# Patient Record
Sex: FEMALE | Race: ASIAN | NOT HISPANIC OR LATINO | Employment: OTHER | URBAN - METROPOLITAN AREA
[De-identification: names, ages, dates, MRNs, and addresses within clinical notes are randomized per-mention and may not be internally consistent; named-entity substitution may affect disease eponyms.]

---

## 2019-01-06 ENCOUNTER — OFFICE VISIT (OUTPATIENT)
Dept: URGENT CARE | Facility: PHYSICIAN GROUP | Age: 69
End: 2019-01-06
Payer: MEDICARE

## 2019-01-06 VITALS
RESPIRATION RATE: 16 BRPM | WEIGHT: 120 LBS | TEMPERATURE: 98.4 F | HEART RATE: 69 BPM | DIASTOLIC BLOOD PRESSURE: 80 MMHG | SYSTOLIC BLOOD PRESSURE: 124 MMHG | HEIGHT: 63 IN | BODY MASS INDEX: 21.26 KG/M2 | OXYGEN SATURATION: 99 %

## 2019-01-06 DIAGNOSIS — S61.311A LACERATION OF LEFT INDEX FINGER WITHOUT FOREIGN BODY WITH DAMAGE TO NAIL, INITIAL ENCOUNTER: ICD-10-CM

## 2019-01-06 PROCEDURE — 12041 INTMD RPR N-HF/GENIT 2.5CM/<: CPT | Mod: F1 | Performed by: FAMILY MEDICINE

## 2019-01-06 ASSESSMENT — ENCOUNTER SYMPTOMS
BRUISES/BLEEDS EASILY: 0
FEVER: 0
DIARRHEA: 0
PALPITATIONS: 0
SHORTNESS OF BREATH: 0
VOMITING: 0
CHILLS: 0
BACK PAIN: 0
NAUSEA: 0
ABDOMINAL PAIN: 0
DIZZINESS: 0
DEPRESSION: 0
HEADACHES: 0
EYE REDNESS: 0
WHEEZING: 0
SORE THROAT: 0
EYE DISCHARGE: 0
COUGH: 0

## 2019-01-06 NOTE — PROGRESS NOTES
Subjective:      Christelle Nunez is a 68 y.o. female who presents with Laceration (L index finger with a saw blade while working at home)        67 yo female presents after cutting left index finger on a rotary saw blade while making her son floating shelves for his new home immediately prior to presenting to the urgent care. She reports that her son's friend was at the house and was able to drive her to urgent care. She has been applying pressure but it continues to bleed. Discussed with patient going to ER and she is very resistant, would like to be treated, have attempted have laceration repair at  if possible. Flexion/extension intact, no visible bone or tendons.      Review of Systems   Constitutional: Negative for chills and fever.   HENT: Negative for congestion and sore throat.    Eyes: Negative for discharge and redness.   Respiratory: Negative for cough, shortness of breath and wheezing.    Cardiovascular: Negative for chest pain and palpitations.   Gastrointestinal: Negative for abdominal pain, diarrhea, nausea and vomiting.   Genitourinary: Negative for dysuria, frequency and urgency.   Musculoskeletal: Negative for back pain and joint pain.   Skin: Negative for itching and rash.        +wound to left index finger   Neurological: Negative for dizziness and headaches.   Endo/Heme/Allergies: Negative for environmental allergies. Does not bruise/bleed easily.   Psychiatric/Behavioral: Negative for depression and suicidal ideas.   All other systems reviewed and are negative.      Past Medical History:   Diagnosis Date   • Patient denies medical problems      No past surgical history on file.    No family history on file.    Social History   Substance Use Topics   • Smoking status: Never Smoker   • Smokeless tobacco: Never Used   • Alcohol use No     Patient has no known allergies.    Current Outpatient Prescriptions on File Prior to Visit   Medication Sig Dispense Refill   • FLUoxetine (PROZAC) 20 MG  "Cap Take 20 mg by mouth every day.     • ibuprofen (MOTRIN) 200 MG Tab Take 200 mg by mouth every 6 hours as needed.       No current facility-administered medications on file prior to visit.         Objective:     /80   Pulse 69   Temp 36.9 °C (98.4 °F) (Temporal)   Resp 16   Ht 1.6 m (5' 3\")   Wt 54.4 kg (120 lb)   SpO2 99%   BMI 21.26 kg/m²      Physical Exam   Constitutional: She is oriented to person, place, and time. She appears well-developed and well-nourished. No distress.   HENT:   Head: Normocephalic and atraumatic.   Right Ear: External ear normal.   Left Ear: External ear normal.   Eyes: Conjunctivae and EOM are normal.   Cardiovascular: Normal rate and regular rhythm.    Pulmonary/Chest: Effort normal. No respiratory distress.   Musculoskeletal: Normal range of motion. She exhibits no edema or deformity.   Neurological: She is alert and oriented to person, place, and time. She exhibits normal muscle tone.   Skin: Skin is warm and dry. Capillary refill takes less than 2 seconds. No rash noted. She is not diaphoretic. No erythema.   Open wound of left index finger with active bleeding. Skin tear in multiple directions with skin flap in the center portion of the wound attached by 2 mm segment. Subcutaneous tissue mangled with swelling. No visible bone or tendon. Flexion/extension of finger intact.     LUE: neurovascularly intact   Psychiatric: She has a normal mood and affect. Her behavior is normal. Judgment and thought content normal.   Nursing note and vitals reviewed.       Assessment/Plan:     1. Laceration of left index finger without foreign body with damage to nail, initial encounter  Laceration Repair     - Wound irrigated with normal saline and explored. No foreign bodies identified. Discussed with patient that the wound could not be completely closed due to large skin flap attached by very small (<2 mm) segment. Patient declined going to ER.   - See Procedure note. Local anesthesia " achieved with digital block. Skin flap removed. Area cleansed with Betadine Swabs. Five deep absorbable sutures placed. Skin approximated with five skin sutures, however, skin defect remained at the center of the wound, which could not be closed. Good hemostasis achieved. Clean bandage applied to area.  - Keep wound clean and dry. Monitor for any signs of infection.  - Due to difficult nature of repair, return to clinic in 2 DAYS for wound check.  - Tylenol, ice, elevation for pain    Differential diagnosis, natural history, supportive care discussed. Follow-up with primary care provider within 7-10 days, emergency room precautions discussed.  Patient and/or family appears understanding of information.    Return in about 2 days (around 1/8/2019).     Mariana Salvador

## 2019-01-07 NOTE — PROCEDURES
Laceration Repair  Date/Time: 1/6/2019 4:43 PM  Performed by: PROSPER WEBB  Authorized by: PROSPER WEBB   Body area: upper extremity  Location details: left index finger  Foreign bodies: no foreign bodies  Tendon involvement: none  Nerve involvement: none  Anesthesia: digital block    Anesthesia:  Local Anesthetic: lidocaine 2% without epinephrine  Anesthetic total: 4 mL    Sedation:  Patient sedated: no  Irrigation solution: saline  Irrigation method: syringe  Amount of cleaning: extensive  Debridement: none  Skin closure: 5-0 nylon  Subcutaneous closure: 4-0 Vicryl  Technique: simple  Approximation difficulty: complex  Dressing: antibiotic ointment  Patient tolerance: Patient tolerated the procedure well with no immediate complications  Comments: Large skin flap removed from middle of injury, 5 subcutaneous sutures placed and 5 skin sutures placed with defect in middle where skin could not be approximated.

## 2019-01-08 ENCOUNTER — OFFICE VISIT (OUTPATIENT)
Dept: URGENT CARE | Facility: PHYSICIAN GROUP | Age: 69
End: 2019-01-08
Payer: MEDICARE

## 2019-01-08 VITALS
TEMPERATURE: 97.8 F | HEART RATE: 83 BPM | SYSTOLIC BLOOD PRESSURE: 152 MMHG | WEIGHT: 120 LBS | DIASTOLIC BLOOD PRESSURE: 94 MMHG | OXYGEN SATURATION: 96 % | BODY MASS INDEX: 21.26 KG/M2 | HEIGHT: 63 IN | RESPIRATION RATE: 18 BRPM

## 2019-01-08 DIAGNOSIS — Z51.89 VISIT FOR WOUND CHECK: ICD-10-CM

## 2019-01-08 PROCEDURE — 99024 POSTOP FOLLOW-UP VISIT: CPT | Performed by: PHYSICIAN ASSISTANT

## 2019-01-08 RX ORDER — ACETAMINOPHEN 325 MG/1
650 TABLET ORAL EVERY 4 HOURS PRN
COMMUNITY

## 2019-01-08 NOTE — PROGRESS NOTES
"Subjective:      Christelle Nunez is a 68 y.o. female who presents with Wound Check (L index finger onset sunday )          Patient is a pleasant 68-year-old female who presents to urgent care with left second digit wound recheck after laceration repair 2 days ago.  Patient cut her finger on a saw of which was adamant about being seen in urgent care she had suture repair with absorbable sutures and 5 interrupted sutures to approximate the wound however still with an approximated edges.  Patient reports she has been taking Tylenol with slight relief of discomfort.  She continues to have slight bloody drainage however the swelling has improved as well.  She has been keeping the wound clean and dry however covered.    Wound Check   She was originally treated 2 to 3 days ago. Previous treatment included laceration repair. There has been bloody discharge from the wound. The redness has improved. The swelling has improved. The pain has improved.       ROS  Denies any fevers, chills.  Improved swelling.  Limited range of motion secondary to pain     Objective:     /94   Pulse 83   Temp 36.6 °C (97.8 °F) (Temporal)   Resp 18   Ht 1.6 m (5' 3\")   Wt 54.4 kg (120 lb)   SpO2 96%   BMI 21.26 kg/m²    PMH:  has a past medical history of Patient denies medical problems.  MEDS:   Current Outpatient Prescriptions:   •  acetaminophen (TYLENOL) 325 MG Tab, Take 650 mg by mouth every four hours as needed., Disp: , Rfl:   •  FLUoxetine (PROZAC) 20 MG Cap, Take 20 mg by mouth every day., Disp: , Rfl:   •  ibuprofen (MOTRIN) 200 MG Tab, Take 200 mg by mouth every 6 hours as needed., Disp: , Rfl:   ALLERGIES: No Known Allergies  SURGHX: No past surgical history on file.  SOCHX:  reports that she has never smoked. She has never used smokeless tobacco. She reports that she does not drink alcohol or use drugs.  FH: Family history was reviewed, no pertinent findings to report    Physical Exam       Left index finger with " notable swelling to the PIP extending to the DIP.  5 sutures intact-with small area of open wound with minimal bloody drainage.  Wounds are slightly macerated.  Neurovascularly intact distally.  Mild tenderness throughout.     Assessment/Plan:     1. Visit for wound check    Wound edges appear macerated at this time.  Wound was irrigated with sterile saline and patted dry.  Then a layer of Polysporin was applied and a nonstick with dressing bandage.  Patient placed in a finger splint today.  Encourage patient to open wound to for a few hours daily to help with maceration and further wound healing.  Patient is to follow-up in 4-5 days for wound recheck or sooner for signs and symptoms of infection as discussed.  Patient agrees with the plan and understands.

## 2019-01-11 ENCOUNTER — OFFICE VISIT (OUTPATIENT)
Dept: URGENT CARE | Facility: PHYSICIAN GROUP | Age: 69
End: 2019-01-11
Payer: OTHER MISCELLANEOUS

## 2019-01-11 VITALS
DIASTOLIC BLOOD PRESSURE: 80 MMHG | RESPIRATION RATE: 14 BRPM | WEIGHT: 120 LBS | BODY MASS INDEX: 21.26 KG/M2 | SYSTOLIC BLOOD PRESSURE: 160 MMHG | HEIGHT: 63 IN | HEART RATE: 77 BPM | TEMPERATURE: 97.8 F | OXYGEN SATURATION: 97 %

## 2019-01-11 DIAGNOSIS — S61.211D LACERATION OF LEFT INDEX FINGER WITHOUT FOREIGN BODY WITHOUT DAMAGE TO NAIL, SUBSEQUENT ENCOUNTER: ICD-10-CM

## 2019-01-11 PROCEDURE — 99024 POSTOP FOLLOW-UP VISIT: CPT | Performed by: PHYSICIAN ASSISTANT

## 2019-01-11 ASSESSMENT — ENCOUNTER SYMPTOMS
TINGLING: 0
CHILLS: 0
SHORTNESS OF BREATH: 0
VOMITING: 0
NAUSEA: 0
FEVER: 0
SENSORY CHANGE: 0

## 2019-01-11 NOTE — PROGRESS NOTES
"Subjective:   Christelle Nunez is a 68 y.o. female who presents for Suture / Staple Removal (finger lac  )        Suture / Staple Removal     Patient seen today for wound check 6 days status post laceration to left index finger.  She notes wound healing well.  Limited range of motion in digits secondary to swelling.  Denies fevers chills or significant pain    Review of Systems   Constitutional: Negative for chills and fever.   Respiratory: Negative for shortness of breath.    Gastrointestinal: Negative for nausea and vomiting.   Skin: Negative for rash.        POS lac to left index   Neurological: Negative for tingling and sensory change.     No Known Allergies   Objective:   /80   Pulse 77   Temp 36.6 °C (97.8 °F) (Temporal)   Resp 14   Ht 1.6 m (5' 3\")   Wt 54.4 kg (120 lb)   SpO2 97%   BMI 21.26 kg/m²   Physical Exam   Constitutional: She is oriented to person, place, and time. She appears well-developed and well-nourished. No distress.   HENT:   Head: Normocephalic and atraumatic.   Right Ear: External ear normal.   Left Ear: External ear normal.   Nose: Nose normal.   Eyes: Conjunctivae and lids are normal. Right eye exhibits no discharge. Left eye exhibits no discharge. No scleral icterus.   Neck: Neck supple.   Pulmonary/Chest: Effort normal. No respiratory distress.   Musculoskeletal: Normal range of motion.   Neurological: She is alert and oriented to person, place, and time. She is not disoriented.   Skin: Skin is warm and dry. Laceration ( healing laceration to left index finger) noted. She is not diaphoretic. No erythema. No pallor.   Psychiatric: Her speech is normal and behavior is normal.   Nursing note and vitals reviewed.        Assessment/Plan:   1. Laceration of left index finger without foreign body without damage to nail, subsequent encounter    Wound healing well, no evidence of infection at this time, wound margins coming together nicely, return to clinic in another 3-4 days " for suture removal or sooner with progression of redness fevers chills pain or purulent discharge    Differential diagnosis, natural history, supportive care, and indications for immediate follow-up discussed.

## 2019-01-17 ENCOUNTER — OFFICE VISIT (OUTPATIENT)
Dept: URGENT CARE | Facility: PHYSICIAN GROUP | Age: 69
End: 2019-01-17
Payer: OTHER MISCELLANEOUS

## 2019-01-17 VITALS
HEIGHT: 63 IN | SYSTOLIC BLOOD PRESSURE: 118 MMHG | OXYGEN SATURATION: 97 % | DIASTOLIC BLOOD PRESSURE: 74 MMHG | WEIGHT: 120 LBS | RESPIRATION RATE: 14 BRPM | HEART RATE: 64 BPM | BODY MASS INDEX: 21.26 KG/M2 | TEMPERATURE: 98.6 F

## 2019-01-17 DIAGNOSIS — Z48.02 VISIT FOR SUTURE REMOVAL: ICD-10-CM

## 2019-01-17 PROCEDURE — 99024 POSTOP FOLLOW-UP VISIT: CPT | Performed by: PHYSICIAN ASSISTANT

## 2019-01-18 NOTE — PROGRESS NOTES
Patient here for wound recheck.    patient underwent suture repair on January 6 of which she was notably to have deep left index finger laceration with absorbable sutures and 5 interrupted sutures placed.  Recheck on January 8 and 11 of which patient slowly was improving.  Patient returns today for suture removal.  Patient reports slight drainage however the drainage has been mainly been clear at this time.  She does report slight tingling and numbness to the distal tip of the finger however she feels that this is minimal at this time.  She does report swelling, pain all have improved.  She continues to deny any fevers, chills.  She does report slight limited range of motion to the PIP joint however this also has improved.      o:   Left second digit-well-healing laceration with small area of notable scab.  5 sutures intact these were removed without difficulty today.  No evidence of surrounding erythema.  Minimal edema adjacent to the wound.  Limited range of motion still at the PIP with slight decreased sensation at the tip.  Encouraged stretching and ice if needed.    A/p    Wound recheck.  Return to clinic for any further concerns or issues.